# Patient Record
Sex: MALE | Race: WHITE
[De-identification: names, ages, dates, MRNs, and addresses within clinical notes are randomized per-mention and may not be internally consistent; named-entity substitution may affect disease eponyms.]

---

## 2019-10-01 ENCOUNTER — HOSPITAL ENCOUNTER (INPATIENT)
Dept: HOSPITAL 15 - ER | Age: 69
LOS: 6 days | Discharge: HOME HEALTH | DRG: 871 | End: 2019-10-07
Attending: FAMILY MEDICINE | Admitting: NURSE PRACTITIONER
Payer: OTHER GOVERNMENT

## 2019-10-01 VITALS — SYSTOLIC BLOOD PRESSURE: 145 MMHG | DIASTOLIC BLOOD PRESSURE: 90 MMHG

## 2019-10-01 VITALS — DIASTOLIC BLOOD PRESSURE: 94 MMHG | SYSTOLIC BLOOD PRESSURE: 161 MMHG

## 2019-10-01 VITALS — BODY MASS INDEX: 35.92 KG/M2 | WEIGHT: 242.51 LBS | HEIGHT: 69 IN

## 2019-10-01 VITALS — SYSTOLIC BLOOD PRESSURE: 137 MMHG | DIASTOLIC BLOOD PRESSURE: 63 MMHG

## 2019-10-01 DIAGNOSIS — E78.5: ICD-10-CM

## 2019-10-01 DIAGNOSIS — F41.9: ICD-10-CM

## 2019-10-01 DIAGNOSIS — Z88.0: ICD-10-CM

## 2019-10-01 DIAGNOSIS — G47.33: ICD-10-CM

## 2019-10-01 DIAGNOSIS — K59.09: ICD-10-CM

## 2019-10-01 DIAGNOSIS — G40.909: ICD-10-CM

## 2019-10-01 DIAGNOSIS — A41.9: Primary | ICD-10-CM

## 2019-10-01 DIAGNOSIS — Z79.899: ICD-10-CM

## 2019-10-01 DIAGNOSIS — D64.9: ICD-10-CM

## 2019-10-01 DIAGNOSIS — G93.41: ICD-10-CM

## 2019-10-01 DIAGNOSIS — D69.6: ICD-10-CM

## 2019-10-01 DIAGNOSIS — F05: ICD-10-CM

## 2019-10-01 DIAGNOSIS — F32.9: ICD-10-CM

## 2019-10-01 DIAGNOSIS — I10: ICD-10-CM

## 2019-10-01 DIAGNOSIS — J18.9: ICD-10-CM

## 2019-10-01 DIAGNOSIS — E66.01: ICD-10-CM

## 2019-10-01 DIAGNOSIS — Z86.73: ICD-10-CM

## 2019-10-01 LAB
ALBUMIN SERPL-MCNC: 4 G/DL (ref 3.4–5)
ALP SERPL-CCNC: 65 U/L (ref 45–117)
ALT SERPL-CCNC: 39 U/L (ref 16–61)
ANION GAP SERPL CALCULATED.3IONS-SCNC: 8 MMOL/L (ref 5–15)
APTT PPP: 31.6 SEC (ref 23.64–32.05)
BILIRUB SERPL-MCNC: 1.1 MG/DL (ref 0.2–1)
BUN SERPL-MCNC: 20 MG/DL (ref 7–18)
BUN/CREAT SERPL: 13.1
CALCIUM SERPL-MCNC: 9.2 MG/DL (ref 8.5–10.1)
CHLORIDE SERPL-SCNC: 106 MMOL/L (ref 98–107)
CO2 SERPL-SCNC: 22 MMOL/L (ref 21–32)
GLUCOSE SERPL-MCNC: 91 MG/DL (ref 74–106)
HCT VFR BLD AUTO: 36.4 % (ref 41–53)
HGB BLD-MCNC: 12.5 G/DL (ref 13.5–17.5)
INR PPP: 1.17 (ref 0.9–1.15)
MCH RBC QN AUTO: 31.8 PG (ref 28–32)
MCV RBC AUTO: 92.9 FL (ref 80–100)
NRBC BLD QL AUTO: 0.1 %
POTASSIUM SERPL-SCNC: 4.2 MMOL/L (ref 3.5–5.1)
PROT SERPL-MCNC: 7.7 G/DL (ref 6.4–8.2)
SODIUM SERPL-SCNC: 136 MMOL/L (ref 136–145)

## 2019-10-01 PROCEDURE — 82140 ASSAY OF AMMONIA: CPT

## 2019-10-01 PROCEDURE — 85730 THROMBOPLASTIN TIME PARTIAL: CPT

## 2019-10-01 PROCEDURE — 87040 BLOOD CULTURE FOR BACTERIA: CPT

## 2019-10-01 PROCEDURE — 97116 GAIT TRAINING THERAPY: CPT

## 2019-10-01 PROCEDURE — 94640 AIRWAY INHALATION TREATMENT: CPT

## 2019-10-01 PROCEDURE — 71045 X-RAY EXAM CHEST 1 VIEW: CPT

## 2019-10-01 PROCEDURE — 80164 ASSAY DIPROPYLACETIC ACD TOT: CPT

## 2019-10-01 PROCEDURE — 85025 COMPLETE CBC W/AUTO DIFF WBC: CPT

## 2019-10-01 PROCEDURE — 84484 ASSAY OF TROPONIN QUANT: CPT

## 2019-10-01 PROCEDURE — 70450 CT HEAD/BRAIN W/O DYE: CPT

## 2019-10-01 PROCEDURE — 36415 COLL VENOUS BLD VENIPUNCTURE: CPT

## 2019-10-01 PROCEDURE — 97530 THERAPEUTIC ACTIVITIES: CPT

## 2019-10-01 PROCEDURE — 80053 COMPREHEN METABOLIC PANEL: CPT

## 2019-10-01 PROCEDURE — 96367 TX/PROPH/DG ADDL SEQ IV INF: CPT

## 2019-10-01 PROCEDURE — 95819 EEG AWAKE AND ASLEEP: CPT

## 2019-10-01 PROCEDURE — 96375 TX/PRO/DX INJ NEW DRUG ADDON: CPT

## 2019-10-01 PROCEDURE — 83605 ASSAY OF LACTIC ACID: CPT

## 2019-10-01 PROCEDURE — 96361 HYDRATE IV INFUSION ADD-ON: CPT

## 2019-10-01 PROCEDURE — 94761 N-INVAS EAR/PLS OXIMETRY MLT: CPT

## 2019-10-01 PROCEDURE — 87086 URINE CULTURE/COLONY COUNT: CPT

## 2019-10-01 PROCEDURE — 85610 PROTHROMBIN TIME: CPT

## 2019-10-01 PROCEDURE — 81001 URINALYSIS AUTO W/SCOPE: CPT

## 2019-10-01 PROCEDURE — 94660 CPAP INITIATION&MGMT: CPT

## 2019-10-01 PROCEDURE — 96365 THER/PROPH/DIAG IV INF INIT: CPT

## 2019-10-01 PROCEDURE — 76705 ECHO EXAM OF ABDOMEN: CPT

## 2019-10-01 PROCEDURE — 80048 BASIC METABOLIC PNL TOTAL CA: CPT

## 2019-10-01 PROCEDURE — 87804 INFLUENZA ASSAY W/OPTIC: CPT

## 2019-10-01 RX ADMIN — ACETAMINOPHEN PRN MG: 500 TABLET ORAL at 15:09

## 2019-10-01 RX ADMIN — AZITHROMYCIN DIHYDRATE SCH MLS/HR: 500 INJECTION, POWDER, LYOPHILIZED, FOR SOLUTION INTRAVENOUS at 16:32

## 2019-10-01 RX ADMIN — CLINDAMYCIN PHOSPHATE SCH MLS/HR: 6 INJECTION, SOLUTION INTRAVENOUS at 20:21

## 2019-10-01 RX ADMIN — ALBUTEROL SULFATE SCH MG: 2.5 SOLUTION RESPIRATORY (INHALATION) at 19:13

## 2019-10-01 RX ADMIN — IPRATROPIUM BROMIDE SCH MG: 0.5 SOLUTION RESPIRATORY (INHALATION) at 19:14

## 2019-10-01 RX ADMIN — MORPHINE SULFATE PRN MG: 2 INJECTION, SOLUTION INTRAMUSCULAR; INTRAVENOUS at 21:33

## 2019-10-01 RX ADMIN — SODIUM CHLORIDE SCH MG: 9 INJECTION, SOLUTION INTRAVENOUS at 20:23

## 2019-10-01 RX ADMIN — CEFTRIAXONE SODIUM SCH MLS/HR: 1 INJECTION, POWDER, FOR SOLUTION INTRAMUSCULAR; INTRAVENOUS at 15:18

## 2019-10-01 NOTE — NUR
Opening Shift Note

Assumed care of patient, awake and alert.  No S/S of distress/SOB or pain.  Instructed on 
POC and to call for assist PRN, will continue to monitor for changes Q1hr and PRN.  Patient 
altered unable to follow commands or redirect.  Patient removed PIV in ER requires 1:1  
monitoring for patient safety.  Patient has sitter at bedside to help monitor patient 
safety.

## 2019-10-01 NOTE — NUR
RECEIVED PATIENT TO THE FLOOR, AWAKE ALERT AND ORIENTED X4. PATIENT AMBULATED TO BED FROM 
WHEEL CHAIR. BED LOCKED IN LOWEST POSITION WITH TWO SIDE RAILS UP AND CALL LIGHT IN REACH. 
PATIENT SHIVERING BUT HAS NO FEVER TEMPERATURE IS 98.2 F.

COOLING MEASURES INITIATED UPON ARRIVAL PER ER AND PATIENT HOOKED UP TO 3 L NC. SEIZURES 
PRECAUTIONS PLACED. 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 VS STABLE AT THIS TIME 
145/90, 83, 95%, RR 20. 

WILL ENDORSE ADMISSION AND CARE TO [facility] NURSE.

## 2019-10-01 NOTE — NUR
DIFFICULTY SLEEPING

PATIENT HAS DIFFICULTY SLEEPING.  PATIENT REGULARLY TAKE TRAZADONE 100MG PO QHS AT HOME.  
MEDICATION RECONCILIATION UPDATED.  PAGED HOSPITALIST TO GET ORDERS. AWAITING CALLBACK.

## 2019-10-02 VITALS — SYSTOLIC BLOOD PRESSURE: 139 MMHG | DIASTOLIC BLOOD PRESSURE: 89 MMHG

## 2019-10-02 VITALS — DIASTOLIC BLOOD PRESSURE: 71 MMHG | SYSTOLIC BLOOD PRESSURE: 103 MMHG

## 2019-10-02 VITALS — SYSTOLIC BLOOD PRESSURE: 106 MMHG | DIASTOLIC BLOOD PRESSURE: 53 MMHG

## 2019-10-02 VITALS — SYSTOLIC BLOOD PRESSURE: 124 MMHG | DIASTOLIC BLOOD PRESSURE: 75 MMHG

## 2019-10-02 LAB
ANION GAP SERPL CALCULATED.3IONS-SCNC: 9 MMOL/L (ref 5–15)
BUN SERPL-MCNC: 22 MG/DL (ref 7–18)
BUN/CREAT SERPL: 14.5
CALCIUM SERPL-MCNC: 8.4 MG/DL (ref 8.5–10.1)
CHLORIDE SERPL-SCNC: 107 MMOL/L (ref 98–107)
CO2 SERPL-SCNC: 23 MMOL/L (ref 21–32)
GLUCOSE SERPL-MCNC: 106 MG/DL (ref 74–106)
HCT VFR BLD AUTO: 31.3 % (ref 41–53)
HGB BLD-MCNC: 11.2 G/DL (ref 13.5–17.5)
MCH RBC QN AUTO: 32.1 PG (ref 28–32)
MCV RBC AUTO: 90 FL (ref 80–100)
NRBC BLD QL AUTO: 0.1 %
POTASSIUM SERPL-SCNC: 3.9 MMOL/L (ref 3.5–5.1)
SODIUM SERPL-SCNC: 139 MMOL/L (ref 136–145)

## 2019-10-02 RX ADMIN — CEFTRIAXONE SODIUM SCH MLS/HR: 1 INJECTION, POWDER, FOR SOLUTION INTRAMUSCULAR; INTRAVENOUS at 08:54

## 2019-10-02 RX ADMIN — CLINDAMYCIN PHOSPHATE SCH MLS/HR: 6 INJECTION, SOLUTION INTRAVENOUS at 19:35

## 2019-10-02 RX ADMIN — DOCUSATE SODIUM SCH MG: 100 CAPSULE, LIQUID FILLED ORAL at 19:35

## 2019-10-02 RX ADMIN — ALBUTEROL SULFATE SCH MG: 2.5 SOLUTION RESPIRATORY (INHALATION) at 07:02

## 2019-10-02 RX ADMIN — SODIUM CHLORIDE SCH MG: 9 INJECTION, SOLUTION INTRAVENOUS at 19:36

## 2019-10-02 RX ADMIN — IPRATROPIUM BROMIDE SCH MG: 0.5 SOLUTION RESPIRATORY (INHALATION) at 12:45

## 2019-10-02 RX ADMIN — SODIUM CHLORIDE SCH MG: 9 INJECTION, SOLUTION INTRAVENOUS at 10:04

## 2019-10-02 RX ADMIN — CLINDAMYCIN PHOSPHATE SCH MLS/HR: 6 INJECTION, SOLUTION INTRAVENOUS at 05:58

## 2019-10-02 RX ADMIN — ACETAMINOPHEN PRN MG: 500 TABLET ORAL at 19:35

## 2019-10-02 RX ADMIN — HYDROCODONE BITARTRATE AND ACETAMINOPHEN PRN TAB: 5; 325 TABLET ORAL at 10:04

## 2019-10-02 RX ADMIN — IPRATROPIUM BROMIDE SCH MG: 0.5 SOLUTION RESPIRATORY (INHALATION) at 19:36

## 2019-10-02 RX ADMIN — ALBUTEROL SULFATE SCH MG: 2.5 SOLUTION RESPIRATORY (INHALATION) at 12:45

## 2019-10-02 RX ADMIN — CLINDAMYCIN PHOSPHATE SCH MLS/HR: 6 INJECTION, SOLUTION INTRAVENOUS at 14:04

## 2019-10-02 RX ADMIN — IPRATROPIUM BROMIDE SCH MG: 0.5 SOLUTION RESPIRATORY (INHALATION) at 07:02

## 2019-10-02 RX ADMIN — HYDROCODONE BITARTRATE AND ACETAMINOPHEN PRN TAB: 5; 325 TABLET ORAL at 19:34

## 2019-10-02 RX ADMIN — ALBUTEROL SULFATE SCH MG: 2.5 SOLUTION RESPIRATORY (INHALATION) at 19:36

## 2019-10-02 RX ADMIN — AZITHROMYCIN DIHYDRATE SCH MLS/HR: 500 INJECTION, POWDER, LYOPHILIZED, FOR SOLUTION INTRAVENOUS at 10:04

## 2019-10-02 NOTE — NUR
ATTEMPTED TO REAPPLY TELEMETRY YET PATIENT IS STILL AGGITATED GETTING IN AND OUT OF BED TO 
USE BSC AND REFUSES TO WEAR A HOSPITAL GOWN ON.

## 2019-10-02 NOTE — NUR
IV ACCESS

PATIENT IV ACCESS TO LEFT HAND WAS DISLODGED.  PATIENT WAS TRYING TO GET OUT OF BED AND 
PULLED ON IV ACCESS AND WAS REMOVED.  INSERTED 22G LFA WITH NS@100 WILL CONTINUE TO MONITOR 
PATIENT.  SITTER AT BEDSIDE.

## 2019-10-02 NOTE — NUR
Patient sitting up, laying down in bed, with no gown. Wife said patient took off the gown 
and the Telemetry pack on the chest. Patient on Bed A is yelling, sitter and another CNA are 
cleaning the patient on Bed A.

## 2019-10-02 NOTE — NUR
REBECCA Ayon came over to see the patient. MD looked at the patient's own medications from 
home. Dr. Rao to put in the orders for POM.

## 2019-10-02 NOTE — NUR
SLEEP AID

ADMINISTERED RESTORIL 15MG PO AS PRESCRIBED PATIENT TOLERATED WELL. PATIENT IS ON AND OFF 
SLEEPING BUT STILL WAKES UP AND IS FORGETFUL YET SOMETIMES EASY TO REDIRECT.  SITTER AT 
BEDSIDE.

## 2019-10-02 NOTE — NUR
Paged the Engineering again to fix the A/C in the room. Patient is shivering from cold. 
Additional blankets, hot pack given to patient. Sitter at bedside. Wife came over.

## 2019-10-02 NOTE — NUR
Informed Charge Nurse Seble if patient can be transferred to another sitter room, patient 
is getting restless because patient on Bed A keeps yelling.

## 2019-10-02 NOTE — NUR
SPOKE WITH SPOUSE NANCI REGARDING PATIENT STATUS AND WENT OVER MEDICATION LIST.  MEDICATION 
WAS SENT UP WITH PATIENT.  AND LEFT IN BEDSIDE DRAWER.

## 2019-10-02 NOTE — NUR
PATIENT REMOVED ALL TELEMETRY AND CLOTHING UNABLE TO REDIRECT PATIENT.  PATIENT CONTINUES TO 
BE AGGITATED WILL REATTEMPT LATER WHEN PATIENT IS MORE CALM

## 2019-10-03 VITALS — SYSTOLIC BLOOD PRESSURE: 120 MMHG | DIASTOLIC BLOOD PRESSURE: 53 MMHG

## 2019-10-03 VITALS — SYSTOLIC BLOOD PRESSURE: 139 MMHG | DIASTOLIC BLOOD PRESSURE: 90 MMHG

## 2019-10-03 VITALS — SYSTOLIC BLOOD PRESSURE: 134 MMHG | DIASTOLIC BLOOD PRESSURE: 87 MMHG

## 2019-10-03 VITALS — SYSTOLIC BLOOD PRESSURE: 131 MMHG | DIASTOLIC BLOOD PRESSURE: 74 MMHG

## 2019-10-03 RX ADMIN — ALBUTEROL SULFATE SCH MG: 2.5 SOLUTION RESPIRATORY (INHALATION) at 19:50

## 2019-10-03 RX ADMIN — DOCUSATE SODIUM SCH MG: 100 CAPSULE, LIQUID FILLED ORAL at 10:02

## 2019-10-03 RX ADMIN — SODIUM CHLORIDE SCH MLS/HR: 0.9 INJECTION, SOLUTION INTRAVENOUS at 12:20

## 2019-10-03 RX ADMIN — ALBUTEROL SULFATE SCH MG: 2.5 SOLUTION RESPIRATORY (INHALATION) at 11:58

## 2019-10-03 RX ADMIN — SODIUM CHLORIDE SCH MG: 9 INJECTION, SOLUTION INTRAVENOUS at 19:50

## 2019-10-03 RX ADMIN — AZITHROMYCIN DIHYDRATE SCH MLS/HR: 500 INJECTION, POWDER, LYOPHILIZED, FOR SOLUTION INTRAVENOUS at 10:11

## 2019-10-03 RX ADMIN — IPRATROPIUM BROMIDE SCH MG: 0.5 SOLUTION RESPIRATORY (INHALATION) at 07:29

## 2019-10-03 RX ADMIN — SODIUM CHLORIDE SCH MG: 9 INJECTION, SOLUTION INTRAVENOUS at 10:15

## 2019-10-03 RX ADMIN — HYDROCODONE BITARTRATE AND ACETAMINOPHEN PRN TAB: 5; 325 TABLET ORAL at 04:41

## 2019-10-03 RX ADMIN — IPRATROPIUM BROMIDE SCH MG: 0.5 SOLUTION RESPIRATORY (INHALATION) at 11:58

## 2019-10-03 RX ADMIN — Medication SCH ML: at 17:19

## 2019-10-03 RX ADMIN — MORPHINE SULFATE PRN MG: 2 INJECTION, SOLUTION INTRAMUSCULAR; INTRAVENOUS at 17:56

## 2019-10-03 RX ADMIN — SODIUM CHLORIDE SCH MLS/HR: 0.9 INJECTION, SOLUTION INTRAVENOUS at 21:00

## 2019-10-03 RX ADMIN — CLINDAMYCIN PHOSPHATE SCH MLS/HR: 6 INJECTION, SOLUTION INTRAVENOUS at 22:00

## 2019-10-03 RX ADMIN — ALBUTEROL SULFATE SCH MG: 2.5 SOLUTION RESPIRATORY (INHALATION) at 07:29

## 2019-10-03 RX ADMIN — IPRATROPIUM BROMIDE SCH MG: 0.5 SOLUTION RESPIRATORY (INHALATION) at 19:50

## 2019-10-03 RX ADMIN — CLINDAMYCIN PHOSPHATE SCH MLS/HR: 6 INJECTION, SOLUTION INTRAVENOUS at 04:56

## 2019-10-03 RX ADMIN — CEFTRIAXONE SODIUM SCH MLS/HR: 1 INJECTION, POWDER, FOR SOLUTION INTRAMUSCULAR; INTRAVENOUS at 10:08

## 2019-10-03 RX ADMIN — DOCUSATE SODIUM SCH MG: 100 CAPSULE, LIQUID FILLED ORAL at 19:48

## 2019-10-03 RX ADMIN — CLINDAMYCIN PHOSPHATE SCH MLS/HR: 6 INJECTION, SOLUTION INTRAVENOUS at 14:03

## 2019-10-03 NOTE — NUR
ALCOHOL CONSUMPTION DENIED BY WIFE. WIFE STATES PATIENT DOES NOT CONSUME ANY SUBSTANCE THAT 
WOULD ALTER IS MENTALITY EXCEPT FOR PRESCRIBED PAIN KILLERS. PATIENT'S WIFE STATES NORCO  
5/325 QID PRN IS WHAT PATIENT IS PRESCRIBED. MEDICATION CAN BE LOCATED IN MED REC. PATIENT 
TAKES MEDICATION AT HOME PRN.

## 2019-10-03 NOTE — NUR
PATIENT REFUSING TELE AT THIS TIME. PATIENT HAS  AT BEDSIDE. EDUCATED 
PATIENT ON PURPOSE AND RISK FACTORS OF TELE MONITORING. PATIENT STILL REFUSES TO WEAR 
MONITOR

## 2019-10-03 NOTE — NUR
REPORT GIVEN TO RJ ROSAS. PATIENT RESTING IN BED WITH NO S/S OF DISTRESS, SOB, OR PAIN. 
 AT BEDSIDE.

## 2019-10-03 NOTE — NUR
Initial Nutrition Assessment: See under Interventions for details



Estimated needs based on AJBW 81.3 kg-wt maintenance factors/geriatric needs

5889-2967 kcal (25-30 kcal/kg)

81-98 g (1.0-1.2 g protein/kg)

-------------------------------------------------------------------------------

Addendum: 10/03/19 at 1149 by DAVE CROCKETT RD

-------------------------------------------------------------------------------

Amended: Links added.

## 2019-10-03 NOTE — NUR
CALLED DR VANESSA LARSON. UPDATED M.D OF PATIENT IRRITABLE BEHAVIOR. PATIENT IS AGITATED AND 
CONFUSED. 1 MG XANAX PO HAS BEEN ADMINISTERED, WITH NO THERAPEUTIC EFFECT. NEW ORDERS 
RECEIVED FOR IM HALDOL 5MG, CT OF HEAD WITHOUT CONTRAST, AND NEURO CONSULT.  
AT BEDSIDE WITH PATIENT

## 2019-10-03 NOTE — NUR
pt is 100% service connected, [facility] does not have any beds if we were considering 
[facility]

## 2019-10-03 NOTE — NUR
PATIENT RESTING IN BED COMFORTABLY IN BED ASLEEP.  REAPPLIED TELEMETRY.  YET UNABLE TO PUT 
HOSPITAL GOWN ON PATIENT.  WILL ENDORSE TO AM NURSE TO TRY TO D/C TELEMETRY SINCE PATIENT IS 
NONCOMPLIANT

-------------------------------------------------------------------------------

Addendum: 10/03/19 at 0704 by GLEN LUTZ RN RN

-------------------------------------------------------------------------------

MONITOR TECH STATES PATIENT IS OFF TELEMETRY.  PATIENT IS VERY ANXIOUS AND RIPPED IT OFF PER 
SITTER.  INFORMED PATIENT THAT TELEMETRY NEEDS TO BE ON TO MONITOR HIS HEART.  PLACED 
PATIENT BACK ON TELEMETRY

## 2019-10-04 VITALS — SYSTOLIC BLOOD PRESSURE: 125 MMHG | DIASTOLIC BLOOD PRESSURE: 99 MMHG

## 2019-10-04 VITALS — SYSTOLIC BLOOD PRESSURE: 105 MMHG | DIASTOLIC BLOOD PRESSURE: 80 MMHG

## 2019-10-04 VITALS — DIASTOLIC BLOOD PRESSURE: 115 MMHG | SYSTOLIC BLOOD PRESSURE: 132 MMHG

## 2019-10-04 VITALS — DIASTOLIC BLOOD PRESSURE: 70 MMHG | SYSTOLIC BLOOD PRESSURE: 105 MMHG

## 2019-10-04 VITALS — DIASTOLIC BLOOD PRESSURE: 68 MMHG | SYSTOLIC BLOOD PRESSURE: 126 MMHG

## 2019-10-04 VITALS — DIASTOLIC BLOOD PRESSURE: 70 MMHG | SYSTOLIC BLOOD PRESSURE: 117 MMHG

## 2019-10-04 RX ADMIN — ALBUTEROL SULFATE SCH MG: 2.5 SOLUTION RESPIRATORY (INHALATION) at 12:45

## 2019-10-04 RX ADMIN — CLINDAMYCIN PHOSPHATE SCH MLS/HR: 6 INJECTION, SOLUTION INTRAVENOUS at 14:00

## 2019-10-04 RX ADMIN — IPRATROPIUM BROMIDE SCH MG: 0.5 SOLUTION RESPIRATORY (INHALATION) at 18:54

## 2019-10-04 RX ADMIN — Medication SCH ML: at 18:00

## 2019-10-04 RX ADMIN — ACETAMINOPHEN PRN MG: 500 TABLET ORAL at 23:05

## 2019-10-04 RX ADMIN — ACETAMINOPHEN PRN MG: 500 TABLET ORAL at 00:20

## 2019-10-04 RX ADMIN — ALBUTEROL SULFATE SCH MG: 2.5 SOLUTION RESPIRATORY (INHALATION) at 18:54

## 2019-10-04 RX ADMIN — SODIUM CHLORIDE SCH MG: 9 INJECTION, SOLUTION INTRAVENOUS at 10:20

## 2019-10-04 RX ADMIN — SODIUM CHLORIDE SCH MG: 9 INJECTION, SOLUTION INTRAVENOUS at 22:00

## 2019-10-04 RX ADMIN — SODIUM CHLORIDE SCH MLS/HR: 0.9 INJECTION, SOLUTION INTRAVENOUS at 17:00

## 2019-10-04 RX ADMIN — SODIUM CHLORIDE SCH MLS/HR: 0.9 INJECTION, SOLUTION INTRAVENOUS at 06:38

## 2019-10-04 RX ADMIN — DOCUSATE SODIUM SCH MG: 100 CAPSULE, LIQUID FILLED ORAL at 10:21

## 2019-10-04 RX ADMIN — CEFTRIAXONE SODIUM SCH MLS/HR: 1 INJECTION, POWDER, FOR SOLUTION INTRAMUSCULAR; INTRAVENOUS at 10:20

## 2019-10-04 RX ADMIN — HALOPERIDOL LACTATE PRN MG: 5 INJECTION, SOLUTION INTRAMUSCULAR at 11:56

## 2019-10-04 RX ADMIN — CLINDAMYCIN PHOSPHATE SCH MLS/HR: 6 INJECTION, SOLUTION INTRAVENOUS at 04:40

## 2019-10-04 RX ADMIN — ALBUTEROL SULFATE SCH MG: 2.5 SOLUTION RESPIRATORY (INHALATION) at 07:07

## 2019-10-04 RX ADMIN — ACETAMINOPHEN PRN MG: 500 TABLET ORAL at 13:40

## 2019-10-04 RX ADMIN — AZITHROMYCIN DIHYDRATE SCH MLS/HR: 500 INJECTION, POWDER, LYOPHILIZED, FOR SOLUTION INTRAVENOUS at 10:21

## 2019-10-04 RX ADMIN — IPRATROPIUM BROMIDE SCH MG: 0.5 SOLUTION RESPIRATORY (INHALATION) at 07:07

## 2019-10-04 RX ADMIN — HYDROCODONE BITARTRATE AND ACETAMINOPHEN PRN TAB: 5; 325 TABLET ORAL at 05:45

## 2019-10-04 RX ADMIN — Medication SCH ML: at 10:20

## 2019-10-04 RX ADMIN — IPRATROPIUM BROMIDE SCH MG: 0.5 SOLUTION RESPIRATORY (INHALATION) at 12:45

## 2019-10-04 RX ADMIN — HALOPERIDOL LACTATE PRN MG: 5 INJECTION, SOLUTION INTRAMUSCULAR at 20:21

## 2019-10-04 RX ADMIN — CLINDAMYCIN PHOSPHATE SCH MLS/HR: 6 INJECTION, SOLUTION INTRAVENOUS at 22:00

## 2019-10-04 RX ADMIN — DOCUSATE SODIUM SCH MG: 100 CAPSULE, LIQUID FILLED ORAL at 22:00

## 2019-10-04 NOTE — NUR
assessment

Patient is a 69 year old male who is confused. Prior to admission patient lived home with 
his wife Lucie and was independent. Per Lucie patient has a fww for home use. Per Lucie 
patients PCP is Dr Pham at the [facility]. Lucie informed me patient had a high fever and was 
becoming confused. [facility] not [facility] as a [facility]. 
Lucie then brought patient to urgent care and then was admitted through the ER. Per Lucie this 
is new confusion. Per Lucie patient will return home with her on discharge. Lucie will be here 
in the morning to see patient and speak to the MD. Patient does not have a advanced 
directive or POA. I informed Lucie once patient is alert and oriented he should fill out and 
have notarized advance directive. Lucie verbalized understanding and agreed to discharged 
plan home. 

-------------------------------------------------------------------------------

Addendum: 10/04/19 at 1630 by Cathryn BARR

-------------------------------------------------------------------------------

Amended: Links added.

## 2019-10-04 NOTE — NUR
FEVER/PAIN

PT C/O OF GENERALIZED PAIN AND HAD T100.2  ADMINISTERED NORCO 1 TAB PO AS PRESCRIBED.  
COOLING MEASURES INITIATED.  PATIENT CONTINUES OF IVF NS@100. 

-------------------------------------------------------------------------------

Addendum: 10/04/19 at 0649 by GLEN LUTZ RN RN

-------------------------------------------------------------------------------

PATIENT RESTING IN BED ATTEMPTED TO REAPPLY PATIENT TELEMETRY WITH NO SUCCESS.  PATIENT TEMP 
98.7 AND CONTINUES ON IVF NS@100.  SITTER AT BEDSIDE TO MONITOR PATIENT. SAFETY.

## 2019-10-04 NOTE — NUR
PAIN

PT C/O OF GENERALIZED PAIN 7/10.  PATIENT LOOKS UNCOMFORTABLE AND GUARDING RESTLESS IN BED . 
 ADMINISTERED MORPHINE IV AS PRESCRIBED.  PATIENT TOLERATED WELL.  WILL CONTINUE TO MONITOR 
PATINET.

## 2019-10-04 NOTE — NUR
Opening Shift Note

Assumed care of patient, awake and alert aggitated anxious and unable to follow commands.  
patient was moved from 285B to 294A.  He might be anxious from the move, yet unable to calm 
or redirect.  No S/S of respiratory distress/SOB or pain.  Administered pt Haldol 2.5mg IM 
Left deltoid.  patient tolerated well.  Instructed on POC and to call for assist PRN sitter 
at bedside for patient safety, will continue to monitor for changes Q1hr and PRN.

-------------------------------------------------------------------------------

Addendum: 10/04/19 at 2043 by GLEN LUTZ RN RN

-------------------------------------------------------------------------------

patient had fever earlier.  rechecked patient is 97.9 afebrile.

## 2019-10-04 NOTE — NUR
Fever

Reported to nurse by dru that patient has a fever of 102.1. Doctor Aristeo called to make 
aware. New orders for repeat blood and urine cultures. Will administer tylenol and continue 
to monitor.

## 2019-10-05 VITALS — SYSTOLIC BLOOD PRESSURE: 150 MMHG | DIASTOLIC BLOOD PRESSURE: 90 MMHG

## 2019-10-05 VITALS — SYSTOLIC BLOOD PRESSURE: 128 MMHG | DIASTOLIC BLOOD PRESSURE: 83 MMHG

## 2019-10-05 VITALS — DIASTOLIC BLOOD PRESSURE: 83 MMHG | SYSTOLIC BLOOD PRESSURE: 128 MMHG

## 2019-10-05 VITALS — SYSTOLIC BLOOD PRESSURE: 117 MMHG | DIASTOLIC BLOOD PRESSURE: 71 MMHG

## 2019-10-05 RX ADMIN — SODIUM CHLORIDE SCH MLS/HR: 0.9 INJECTION, SOLUTION INTRAVENOUS at 23:00

## 2019-10-05 RX ADMIN — AZITHROMYCIN DIHYDRATE SCH MLS/HR: 500 INJECTION, POWDER, LYOPHILIZED, FOR SOLUTION INTRAVENOUS at 09:13

## 2019-10-05 RX ADMIN — IPRATROPIUM BROMIDE SCH MG: 0.5 SOLUTION RESPIRATORY (INHALATION) at 08:53

## 2019-10-05 RX ADMIN — CLINDAMYCIN PHOSPHATE SCH MLS/HR: 6 INJECTION, SOLUTION INTRAVENOUS at 06:24

## 2019-10-05 RX ADMIN — Medication SCH ML: at 09:14

## 2019-10-05 RX ADMIN — ALBUTEROL SULFATE SCH MG: 2.5 SOLUTION RESPIRATORY (INHALATION) at 08:53

## 2019-10-05 RX ADMIN — CLINDAMYCIN PHOSPHATE SCH MLS/HR: 6 INJECTION, SOLUTION INTRAVENOUS at 15:29

## 2019-10-05 RX ADMIN — HALOPERIDOL LACTATE PRN MG: 5 INJECTION, SOLUTION INTRAMUSCULAR at 11:48

## 2019-10-05 RX ADMIN — CEFTRIAXONE SODIUM SCH MLS/HR: 1 INJECTION, POWDER, FOR SOLUTION INTRAMUSCULAR; INTRAVENOUS at 10:46

## 2019-10-05 RX ADMIN — SODIUM CHLORIDE SCH MG: 9 INJECTION, SOLUTION INTRAVENOUS at 10:08

## 2019-10-05 RX ADMIN — ALBUTEROL SULFATE SCH MG: 2.5 SOLUTION RESPIRATORY (INHALATION) at 13:28

## 2019-10-05 RX ADMIN — CLINDAMYCIN PHOSPHATE SCH MLS/HR: 6 INJECTION, SOLUTION INTRAVENOUS at 22:26

## 2019-10-05 RX ADMIN — ALBUTEROL SULFATE SCH MG: 2.5 SOLUTION RESPIRATORY (INHALATION) at 19:59

## 2019-10-05 RX ADMIN — Medication SCH ML: at 18:28

## 2019-10-05 RX ADMIN — SODIUM CHLORIDE SCH MLS/HR: 0.9 INJECTION, SOLUTION INTRAVENOUS at 14:15

## 2019-10-05 RX ADMIN — SODIUM CHLORIDE SCH MG: 9 INJECTION, SOLUTION INTRAVENOUS at 22:27

## 2019-10-05 RX ADMIN — DOCUSATE SODIUM SCH MG: 100 CAPSULE, LIQUID FILLED ORAL at 10:00

## 2019-10-05 RX ADMIN — SODIUM CHLORIDE SCH MLS/HR: 0.9 INJECTION, SOLUTION INTRAVENOUS at 03:00

## 2019-10-05 RX ADMIN — IPRATROPIUM BROMIDE SCH MG: 0.5 SOLUTION RESPIRATORY (INHALATION) at 13:29

## 2019-10-05 RX ADMIN — IPRATROPIUM BROMIDE SCH MG: 0.5 SOLUTION RESPIRATORY (INHALATION) at 19:59

## 2019-10-05 RX ADMIN — DOCUSATE SODIUM SCH MG: 100 CAPSULE, LIQUID FILLED ORAL at 22:30

## 2019-10-05 RX ADMIN — HYDROCODONE BITARTRATE AND ACETAMINOPHEN PRN TAB: 5; 325 TABLET ORAL at 03:57

## 2019-10-05 NOTE — NUR
Bowel Movement

Patient assisted to bedside commode, patient had large, soft stool. Patient cleaned and 
assisted back to bed. Will continue to monitor Q1 hour and PRN. Sitter at bedside for 
safety.

## 2019-10-05 NOTE — NUR
Patient agitated 

Patient trying to get out of bed, patient states he wants to go home. Family at bedside, 
unable to calm patient down. Will medicate with PRN Haldol. Dr. VANESSA Rao aware. Will continue 
to monitor Q1 hour and PRN. Sitter at bedside for safety.

## 2019-10-05 NOTE — NUR
Closing Note

Report given to night shift RN. No signs or symptoms of distress noted at this time. 

-------------------------------------------------------------------------------

Addendum: 10/05/19 at 1933 by CARY ROCHA RN RN

-------------------------------------------------------------------------------

sitter at bedside for safety

## 2019-10-05 NOTE — NUR
Opening Note

Received report from night shift RN. Patient is resting in bed, anxious and restless. 
Patient is alert and oriented x3. Patient is on 3L NC, respirations even and unlabored. 
Patient denies pain at this time. Sitter at bedside for safety. Reviewed plan of care with 
patient, patient unable to verbalize understanding. Bed in low and locked position, call 
light within reach. Will continue to monitor Q1 hour and PRN.

## 2019-10-05 NOTE — NUR
Rounds

Patient resting in bed with eyes closed. No signs or symptoms of distress noted at this 
time. Will continue to monitor Q1 hour and PRN. Sitter at bedside for safety.

## 2019-10-05 NOTE — NUR
PATIENT SLEPT ALMOST THRU THE NIGHT.  YET THIS MORNING HES WAS INCONTINENT OF URINE, AND WAS 
ABLE TO MAKE SENSE OF WHAT HE WAS SAYING, ANSWERED APPROPRIATELY.  SITTER AT BEDSIDE.  WILL 
CONTINUE TO MONITOR PATIENT.

## 2019-10-05 NOTE — NUR
Opening Shift Note

Assumed care of patient, awake and alert.  No S/S of distress/SOB or pain.  Instructed on 
POC and to call for assist PRN, will continue to monitor for changes Q1hr and PRN. Sitter at 
bedside.

## 2019-10-05 NOTE — NUR
Haldol Administered 

PRN Haldol administered for agitation. Will continue to monitor Q1 hour and PRN.

## 2019-10-06 VITALS — DIASTOLIC BLOOD PRESSURE: 85 MMHG | SYSTOLIC BLOOD PRESSURE: 147 MMHG

## 2019-10-06 VITALS — DIASTOLIC BLOOD PRESSURE: 86 MMHG | SYSTOLIC BLOOD PRESSURE: 155 MMHG

## 2019-10-06 VITALS — DIASTOLIC BLOOD PRESSURE: 76 MMHG | SYSTOLIC BLOOD PRESSURE: 130 MMHG

## 2019-10-06 VITALS — SYSTOLIC BLOOD PRESSURE: 143 MMHG | DIASTOLIC BLOOD PRESSURE: 83 MMHG

## 2019-10-06 VITALS — SYSTOLIC BLOOD PRESSURE: 142 MMHG | DIASTOLIC BLOOD PRESSURE: 82 MMHG

## 2019-10-06 LAB
HCT VFR BLD AUTO: 30.3 % (ref 41–53)
HGB BLD-MCNC: 10.5 G/DL (ref 13.5–17.5)
MCH RBC QN AUTO: 35 PG (ref 28–32)
MCV RBC AUTO: 101.2 FL (ref 80–100)
NRBC BLD QL AUTO: 0 %

## 2019-10-06 RX ADMIN — AZITHROMYCIN DIHYDRATE SCH MLS/HR: 500 INJECTION, POWDER, LYOPHILIZED, FOR SOLUTION INTRAVENOUS at 11:00

## 2019-10-06 RX ADMIN — ALBUTEROL SULFATE SCH MG: 2.5 SOLUTION RESPIRATORY (INHALATION) at 11:11

## 2019-10-06 RX ADMIN — CEFTRIAXONE SODIUM SCH MLS/HR: 1 INJECTION, POWDER, FOR SOLUTION INTRAMUSCULAR; INTRAVENOUS at 09:08

## 2019-10-06 RX ADMIN — CLINDAMYCIN PHOSPHATE SCH MLS/HR: 6 INJECTION, SOLUTION INTRAVENOUS at 14:45

## 2019-10-06 RX ADMIN — DOCUSATE SODIUM SCH MG: 100 CAPSULE, LIQUID FILLED ORAL at 09:09

## 2019-10-06 RX ADMIN — CLINDAMYCIN PHOSPHATE SCH MLS/HR: 6 INJECTION, SOLUTION INTRAVENOUS at 22:05

## 2019-10-06 RX ADMIN — SODIUM CHLORIDE SCH MLS/HR: 0.9 INJECTION, SOLUTION INTRAVENOUS at 09:08

## 2019-10-06 RX ADMIN — CLINDAMYCIN PHOSPHATE SCH MLS/HR: 6 INJECTION, SOLUTION INTRAVENOUS at 06:17

## 2019-10-06 RX ADMIN — Medication SCH ML: at 17:49

## 2019-10-06 RX ADMIN — IPRATROPIUM BROMIDE SCH MG: 0.5 SOLUTION RESPIRATORY (INHALATION) at 07:12

## 2019-10-06 RX ADMIN — SODIUM CHLORIDE SCH MG: 9 INJECTION, SOLUTION INTRAVENOUS at 09:08

## 2019-10-06 RX ADMIN — SODIUM CHLORIDE SCH MG: 9 INJECTION, SOLUTION INTRAVENOUS at 22:05

## 2019-10-06 RX ADMIN — IPRATROPIUM BROMIDE SCH MG: 0.5 SOLUTION RESPIRATORY (INHALATION) at 11:11

## 2019-10-06 RX ADMIN — SODIUM CHLORIDE SCH MLS/HR: 0.9 INJECTION, SOLUTION INTRAVENOUS at 16:40

## 2019-10-06 RX ADMIN — IPRATROPIUM BROMIDE SCH MG: 0.5 SOLUTION RESPIRATORY (INHALATION) at 20:04

## 2019-10-06 RX ADMIN — ALBUTEROL SULFATE SCH MG: 2.5 SOLUTION RESPIRATORY (INHALATION) at 07:12

## 2019-10-06 RX ADMIN — Medication SCH ML: at 08:01

## 2019-10-06 RX ADMIN — ALBUTEROL SULFATE SCH MG: 2.5 SOLUTION RESPIRATORY (INHALATION) at 20:04

## 2019-10-06 RX ADMIN — DOCUSATE SODIUM SCH MG: 100 CAPSULE, LIQUID FILLED ORAL at 22:06

## 2019-10-06 NOTE — NUR
[facility] Hospitalist Dr. Mathew about patient requesting a sleeping pill. 

Dr. Mathew prescribe Temazepam 15mg QHSP PRN. Read orders back to verify. Will follow as 
prescribe.

## 2019-10-06 NOTE — NUR
New IV line started on the right hand, 22 gauge, in one attempt, intact and patent. Patient 
able to tolerate it. Previous IV line on the left forearm, patient complained of pain, IV 
line removed, IV catheter intact, pressure dressing applied.

## 2019-10-06 NOTE — NUR
Dr. Michelle came over. MD ordered CPAP initiation and management before bedtime. Dr. Michelle 
said patient okay for discharge tomorrow as per Neurology perspective.

## 2019-10-07 VITALS — SYSTOLIC BLOOD PRESSURE: 127 MMHG | DIASTOLIC BLOOD PRESSURE: 94 MMHG

## 2019-10-07 VITALS — DIASTOLIC BLOOD PRESSURE: 94 MMHG | SYSTOLIC BLOOD PRESSURE: 127 MMHG

## 2019-10-07 VITALS — SYSTOLIC BLOOD PRESSURE: 164 MMHG | DIASTOLIC BLOOD PRESSURE: 87 MMHG

## 2019-10-07 VITALS — DIASTOLIC BLOOD PRESSURE: 85 MMHG | SYSTOLIC BLOOD PRESSURE: 142 MMHG

## 2019-10-07 RX ADMIN — CLINDAMYCIN PHOSPHATE SCH MLS/HR: 6 INJECTION, SOLUTION INTRAVENOUS at 06:27

## 2019-10-07 RX ADMIN — Medication SCH ML: at 08:53

## 2019-10-07 RX ADMIN — SODIUM CHLORIDE SCH MG: 9 INJECTION, SOLUTION INTRAVENOUS at 10:17

## 2019-10-07 RX ADMIN — DOCUSATE SODIUM SCH MG: 100 CAPSULE, LIQUID FILLED ORAL at 10:00

## 2019-10-07 RX ADMIN — IPRATROPIUM BROMIDE SCH MG: 0.5 SOLUTION RESPIRATORY (INHALATION) at 11:57

## 2019-10-07 RX ADMIN — CEFTRIAXONE SODIUM SCH MLS/HR: 1 INJECTION, POWDER, FOR SOLUTION INTRAMUSCULAR; INTRAVENOUS at 10:17

## 2019-10-07 RX ADMIN — AZITHROMYCIN DIHYDRATE SCH MLS/HR: 500 INJECTION, POWDER, LYOPHILIZED, FOR SOLUTION INTRAVENOUS at 11:01

## 2019-10-07 RX ADMIN — SODIUM CHLORIDE SCH MLS/HR: 0.9 INJECTION, SOLUTION INTRAVENOUS at 06:27

## 2019-10-07 RX ADMIN — CLINDAMYCIN PHOSPHATE SCH MLS/HR: 6 INJECTION, SOLUTION INTRAVENOUS at 14:00

## 2019-10-07 RX ADMIN — IPRATROPIUM BROMIDE SCH MG: 0.5 SOLUTION RESPIRATORY (INHALATION) at 06:41

## 2019-10-07 RX ADMIN — ALBUTEROL SULFATE SCH MG: 2.5 SOLUTION RESPIRATORY (INHALATION) at 06:41

## 2019-10-07 RX ADMIN — ALBUTEROL SULFATE SCH MG: 2.5 SOLUTION RESPIRATORY (INHALATION) at 11:57

## 2019-10-07 NOTE — NUR
Received referral to resume pt's [facility]. ([phone] phone). Fax 
[phone]. The fax was verified and  called [facility] to confirm that pt 
was accepted. The person who accepted at [facility] was Kole. Pt was to be discharge today.

## 2019-10-07 NOTE — NUR
D/C Planning 



Per  to resume service with [facility]. Contacted [facility] Ph:( [phone]) Fax:( [phone]) faxed medical records. Per Teressa from [facility] to resume service for Pt within 
48hrs upon d/c day. Advised RALPH Mendes. 

-------------------------------------------------------------------------------

Addendum: 10/07/19 at 1437 by KATIA YANCEY 

-------------------------------------------------------------------------------

Amended: Links added.

## 2019-10-07 NOTE — NUR
Discharge instructions given as ordered. Home Health resumed with [facility] per Case 
Manager. Encourage to follow up with PMD as instructed. Instructed to follow up with 
appointment with pcp and neurology. All questions and concerns addressed. Patient verbalized 
understanding. IV removed with catheter intact, pressure dressing applied. Patient taken to 
vehicle via wheelchair with all personal belongings, accompanied by staff and family member. 
No distress noted at time of departure.

## 2019-10-07 NOTE — NUR
patient refusing IV clindamycin. informed of need and purpose. Patient stated he would take 
his prescribed antibiotic at home. will continue care.

## 2019-10-07 NOTE — NUR
REBECA AVILES AT BEDSIDE. MADE AWARE OF PATIENT STATUS. RECEIVED ORDERS TO DISCHARGE PATIENT. 
PATIENT AGREEABLE TO DISCHARGE. WILL FOLLOW THROUGH WITH ORDERS.

## 2019-10-07 NOTE — NUR
RT NOTE:

PT OFF BIPAP @ THIS TIME. BEDSIDE AIDE STATED HE HAD COME OFF BIPAP ABOUT 10 MINS PRIOR 
BECAUSE HE WANTED TO SIT BEDSIDE AND DRINK COFFEE. NO SOB OR DISTRESS NOTED.

## 2019-12-30 NOTE — NUR
OPENING SHIFT NOTE

PATIENT RESTING IN BED. RESPIRATIONS EVEN AND UNLABORED. A&OX4. PATIENT IN NO S/S OF 
DISTRESS AT THIS TIME. DENIES ANY PAIN. PATIENT ON ROOM AIR WITH O2 SATURATIONS AT 97 
PERCENT. STATED DOES NOT USE OXYGEN AT HOME JUST CPAP AT NIGHT TIME. PATIENT STATES HE HAS 
HOME HEALTH SET UP AT HOME ALREADY, BUT DOES NOT RECALL THROUGH WHICH COMPANY. PATIENT 
UPDATED ON POC. ALL QUESTIONS ANSWERED. SEIZURE PRECAUTIONS IN PLACE PER HOSPITAL PROTOCOL. 
BED IN LOWEST LOCKED POSITION WITH CALL LIGHT WITHIN REACH. WILL CONTINUE CARE. Detail Level: Generalized Detail Level: Detailed Detail Level: Simple

## 2025-07-12 NOTE — NUR
ADRIANA Probe Removed 1400 Called Respiratory Therapy for patient's CPAP initiation and management before bedtime.